# Patient Record
Sex: MALE | Race: WHITE | Employment: UNEMPLOYED | URBAN - METROPOLITAN AREA
[De-identification: names, ages, dates, MRNs, and addresses within clinical notes are randomized per-mention and may not be internally consistent; named-entity substitution may affect disease eponyms.]

---

## 2022-04-21 ENCOUNTER — TELEPHONE (OUTPATIENT)
Dept: NON INVASIVE DIAGNOSTICS | Age: 22
End: 2022-04-21

## 2022-04-21 NOTE — TELEPHONE ENCOUNTER
Scheduled Tilt table test with Dr Jean Claude Montemayor on 05/05/2022 @ 11:30 AM.  Dacia Patino at the [facility] @ 10:30.     Told the patient nothing to eat or drink after midnight  You may take your medication with tiny sip of water the day of the procedure  Hold the following medications: none  It is recommended that you have someone with you to drive following the tilt table test

## 2022-05-04 ENCOUNTER — TELEPHONE (OUTPATIENT)
Dept: CARDIAC CATH/INVASIVE PROCEDURES | Age: 22
End: 2022-05-04

## 2022-05-04 NOTE — TELEPHONE ENCOUNTER
Attempted to reminded patient of scheduled procedure on  5/5 No instructions given machine will not take message. Other phone numbers disconnected.

## 2022-05-05 ENCOUNTER — HOSPITAL ENCOUNTER (OUTPATIENT)
Dept: CARDIAC CATH/INVASIVE PROCEDURES | Age: 22
Discharge: HOME | End: 2022-05-05
Payer: MEDICAID

## 2022-05-05 VITALS
HEIGHT: 75 IN | TEMPERATURE: 97.6 F | DIASTOLIC BLOOD PRESSURE: 75 MMHG | BODY MASS INDEX: 19.27 KG/M2 | OXYGEN SATURATION: 100 % | HEART RATE: 88 BPM | RESPIRATION RATE: 16 BRPM | WEIGHT: 155 LBS | SYSTOLIC BLOOD PRESSURE: 135 MMHG

## 2022-05-05 PROCEDURE — 93660 TILT TABLE EVALUATION: CPT

## 2022-05-05 PROCEDURE — 93660 TILT TABLE EVALUATION: CPT | Performed by: STUDENT IN AN ORGANIZED HEALTH CARE EDUCATION/TRAINING PROGRAM

## 2022-05-05 NOTE — PROCEDURES
Patient Name: Amelie Ordaz    Patient Date of Birth (Age): 2000 (19 y.o.)  Date of Service: 5/5/2022  Referring Provider: Dr. Luis Manuel Clark    Patient history: Amelie Ordaz is a 19 y.o. male with a history of recurrent loss of consciousness, head trauma (2013 and 2016), anxiety, and 10 mm brain mass. He reports episodes of loss of consciousness for the past ~2 months. He reports episodes are precipitated by position change. He reports poor hydration historically. He reports reduction in frequency of events with improvement in sleep hygiene. He is being evaluated by Neurology (Dr. Luis Manuel Clark) for episodes of loss of consciousness. EEG without significant abnormality. MRI head with 10 mm mass in right anterior parietal lobe near corpus callosum concerning for multiple sclerosis, but less likely neoplasm Dr Marily Rios recommended tilt table test and extended EEG. Dr Hoa Austin contacted Dr Toña Rivers office to inform that [address] [place] [address] does not have EEG monitoring capability. Dr Hoa Austin informed to proceed with Tilt Table Test as extended EEG monitoring was being obtained separately at [facility]. Patient and father deny any other complaints at this time. Procedure: Tilt Table Test (CPT code: 16747)    Indication: patient with clinical value of demonstrating susceptibility to reflex or orthostatic syncope    : Chas Wilkerson DO    Complications: none    Procedure description: The patient presented after of period of at least 4 hours fasting. The patient's chart was reviewed and contraindications (severe coronary artery disease, severe cerebrovascular disease, and pregnancy) were not present. The risks, benefits, and alternatives to the procedure were discussed with the patient, and informed verbal and written consent was obtained. Intravenous access was obtained greater than 30 minutes prior to transporting to the tilt table lab, which is quiet and absent of distractions.  Patient was placed on a motorized tilt-table with a foot board and safety restraints. Patient was instructed to remain as calm and relaxed as possible, as well as to report any symptoms that may develop. Baseline supine blood pressures and heart rates were obtained. The baseline blood pressure is 125/66 mmHg and baseline heart rate is 72 bpm.  After 5 minutes in the supine position, the patient was moved from a passive supine position to a passive 70-degree head-up position within 10 seconds in a smooth fashion. Continuous ECG and sphygmomanometer blood pressure were monitored throughout the test. Patient's heart rate and symptoms were recorded in 3 minute intervals. After 21 minutes in the passive 70-degree head-up position, the study was nondiagnostic as no symptoms or ECG abnormalities consistent with patient's history developed, so the patient was returned to a passive supine position in a smooth fashion within 10 seconds. Additionally, no significant disturbance in patient's BP, heart rate, or heart rhythm were observed. At the conclusion of tilt table testing, the patient's blood pressure and heart rate were back at baseline. Summary: Nondiagnostic as no symptoms or ECG abnormalities consistent with patient's history developed. Keli Marcelino D.O.   Cardiac Electrophysiology  [facility]  [facility]

## 2024-04-17 ENCOUNTER — LAB (OUTPATIENT)
Dept: LAB | Facility: LAB | Age: 24
End: 2024-04-17
Payer: MEDICAID

## 2024-04-17 DIAGNOSIS — F90.2 ATTENTION-DEFICIT HYPERACTIVITY DISORDER, COMBINED TYPE: Primary | ICD-10-CM

## 2024-04-17 LAB
AMPHETAMINES UR QL SCN: NORMAL
BARBITURATES UR QL SCN: NORMAL
BENZODIAZ UR QL SCN: NORMAL
BZE UR QL SCN: NORMAL
CANNABINOIDS UR QL SCN: NORMAL
FENTANYL+NORFENTANYL UR QL SCN: NORMAL
METHADONE UR QL SCN: NORMAL
OPIATES UR QL SCN: NORMAL
OXYCODONE+OXYMORPHONE UR QL SCN: NORMAL
PCP UR QL SCN: NORMAL

## 2024-04-17 PROCEDURE — 80307 DRUG TEST PRSMV CHEM ANLYZR: CPT

## 2024-04-27 ENCOUNTER — HOSPITAL ENCOUNTER (EMERGENCY)
Facility: HOSPITAL | Age: 24
Discharge: HOME | End: 2024-04-27
Attending: EMERGENCY MEDICINE
Payer: MEDICAID

## 2024-04-27 VITALS
TEMPERATURE: 97.8 F | HEART RATE: 101 BPM | OXYGEN SATURATION: 99 % | WEIGHT: 175 LBS | HEIGHT: 72 IN | SYSTOLIC BLOOD PRESSURE: 110 MMHG | RESPIRATION RATE: 18 BRPM | DIASTOLIC BLOOD PRESSURE: 74 MMHG | BODY MASS INDEX: 23.7 KG/M2

## 2024-04-27 DIAGNOSIS — S91.331A PUNCTURE WOUND OF RIGHT FOOT, INITIAL ENCOUNTER: Primary | ICD-10-CM

## 2024-04-27 PROCEDURE — 2500000001 HC RX 250 WO HCPCS SELF ADMINISTERED DRUGS (ALT 637 FOR MEDICARE OP): Mod: SE | Performed by: EMERGENCY MEDICINE

## 2024-04-27 PROCEDURE — 2500000004 HC RX 250 GENERAL PHARMACY W/ HCPCS (ALT 636 FOR OP/ED): Mod: SE | Performed by: EMERGENCY MEDICINE

## 2024-04-27 PROCEDURE — 90471 IMMUNIZATION ADMIN: CPT | Performed by: EMERGENCY MEDICINE

## 2024-04-27 PROCEDURE — 90715 TDAP VACCINE 7 YRS/> IM: CPT | Mod: SE | Performed by: EMERGENCY MEDICINE

## 2024-04-27 PROCEDURE — 99283 EMERGENCY DEPT VISIT LOW MDM: CPT | Mod: 25

## 2024-04-27 RX ORDER — CEPHALEXIN 250 MG/1
500 CAPSULE ORAL ONCE
Status: COMPLETED | OUTPATIENT
Start: 2024-04-27 | End: 2024-04-27

## 2024-04-27 RX ADMIN — CEPHALEXIN 500 MG: 250 CAPSULE ORAL at 17:43

## 2024-04-27 RX ADMIN — TETANUS TOXOID, REDUCED DIPHTHERIA TOXOID AND ACELLULAR PERTUSSIS VACCINE, ADSORBED 0.5 ML: 5; 2.5; 8; 8; 2.5 SUSPENSION INTRAMUSCULAR at 17:43

## 2024-04-27 ASSESSMENT — COLUMBIA-SUICIDE SEVERITY RATING SCALE - C-SSRS
2. HAVE YOU ACTUALLY HAD ANY THOUGHTS OF KILLING YOURSELF?: NO
6. HAVE YOU EVER DONE ANYTHING, STARTED TO DO ANYTHING, OR PREPARED TO DO ANYTHING TO END YOUR LIFE?: NO
1. IN THE PAST MONTH, HAVE YOU WISHED YOU WERE DEAD OR WISHED YOU COULD GO TO SLEEP AND NOT WAKE UP?: NO

## 2024-04-27 ASSESSMENT — PAIN - FUNCTIONAL ASSESSMENT: PAIN_FUNCTIONAL_ASSESSMENT: 0-10

## 2024-04-27 ASSESSMENT — PAIN DESCRIPTION - PAIN TYPE: TYPE: ACUTE PAIN

## 2024-04-27 ASSESSMENT — PAIN SCALES - GENERAL: PAINLEVEL_OUTOF10: 4

## 2024-04-27 ASSESSMENT — PAIN DESCRIPTION - PROGRESSION: CLINICAL_PROGRESSION: NOT CHANGED

## 2024-04-27 NOTE — ED PROVIDER NOTES
HPI   Chief Complaint   Patient presents with    Puncture Wound     Right foot; chloé nail.         Complaint  patient stepped on a nail may have been migdalia   history of present illness this patient states he was cleaning up some chloé material he kicked a roof board which had a nail in it and the nail went through the side of his tennis shoe striking the great toe lateral aspect.    He  has a very superficial puncture wound but he thought the nail was migdalia and he has not had a tetanus shot that he is aware of.   missed or tingling to the toe or foot, no redness no drainage.  He has no symptoms and is not having pain he states he had only minimal bleeding.     physical exam:    General: Vitals noted, no distress. Afebrile. Alert and oriented  x 4 .  Pupils equal and reactive bilaterally    EENT: TMs clear. Posterior oropharynx unremarkable. No meningismus. No LAD.     Cardiac: Regular, rate, rhythm, no murmurs rubs or gallops.     Pulmonary: Lungs clear bilaterally with good aeration. No adventitious breath sounds. No wheezes rales or rhonchi.          Extremities: The right foot great toe metatarsal head area reveals a very faint superficial puncture wound along the lateral aspect of the metatarsal head region of the great toe.  No drainage.  No redness.  No bleeding.  No lymphangitis.  No bony tenderness to the foot.  The wound looks superficial.  The patient is in no distress    Skin: No rash. Intact.     Neuro: No focal neurologic deficits,. Cranial nerves normal as tested from II through XII.                                 [place] Coma Scale Score: 15                     Patient History   History reviewed. No pertinent past medical history.  History reviewed. No pertinent surgical history.  No family history on file.  Social History     Tobacco Use    Smoking status: Unknown    Smokeless tobacco: Not on file   Substance Use Topics    Alcohol use: Not on file    Drug use: Not on file       Physical Exam    ED Triage Vitals [04/27/24 1721]   Temperature Heart Rate Respirations BP   36.6 °C (97.8 °F) (!) 101 18 110/74      SpO2 Temp Source Heart Rate Source Patient Position   99 % Tympanic -- --      BP Location FiO2 (%)     -- --       Physical Exam    ED Course & [facility]   ED Course as of 04/27/24 1732   Sat Apr 27, 2024   1730  the patient is not sure what kind of allergic reaction he had to penicillin we will avoid penicillin but I am not concerned about Pseudomonas since it looks like the puncture actually went through the side of the shoe and not through the sole of a tennis shoe and the wound is very superficial [AG]      ED Course User Index  [AG] Alcon Michelle MD         Diagnoses as of 04/27/24 1732   Puncture wound of right foot, initial encounter       Medical Decision Making    The puncture wound is actually more along the lateral metatarsal head area and it is very superficial.  The patient thinks he is allergic to all penicillin type drugs but he is not sure what reaction he had.  I will provide him with Keflex and we will give him a tetanus booster shot the wound is very superficial    Procedure  Procedures  none     Alcon Michelle MD  04/27/24 1733

## 2024-04-27 NOTE — DISCHARGE INSTRUCTIONS
warm Epsom salt soaks or soapy water soaks 3 times a day.  Antibiotics as prescribed.  Follow-up with your doctor in 2 to 3 days for recheck.  Return to the ER if any redness or swelling or worsening symptoms.

## 2024-12-31 ENCOUNTER — APPOINTMENT (OUTPATIENT)
Dept: CARDIOLOGY | Facility: HOSPITAL | Age: 24
End: 2024-12-31
Payer: MEDICAID

## 2024-12-31 ENCOUNTER — HOSPITAL ENCOUNTER (EMERGENCY)
Facility: HOSPITAL | Age: 24
Discharge: HOME | End: 2024-12-31
Attending: EMERGENCY MEDICINE
Payer: MEDICAID

## 2024-12-31 ENCOUNTER — APPOINTMENT (OUTPATIENT)
Dept: RADIOLOGY | Facility: HOSPITAL | Age: 24
End: 2024-12-31
Payer: MEDICAID

## 2024-12-31 VITALS
HEIGHT: 75 IN | SYSTOLIC BLOOD PRESSURE: 128 MMHG | OXYGEN SATURATION: 97 % | RESPIRATION RATE: 16 BRPM | WEIGHT: 180 LBS | HEART RATE: 90 BPM | TEMPERATURE: 98.7 F | DIASTOLIC BLOOD PRESSURE: 72 MMHG | BODY MASS INDEX: 22.38 KG/M2

## 2024-12-31 DIAGNOSIS — R55 SYNCOPE, UNSPECIFIED SYNCOPE TYPE: ICD-10-CM

## 2024-12-31 DIAGNOSIS — R41.82 ALTERED MENTAL STATUS, UNSPECIFIED ALTERED MENTAL STATUS TYPE: Primary | ICD-10-CM

## 2024-12-31 DIAGNOSIS — E87.6 HYPOKALEMIA: ICD-10-CM

## 2024-12-31 LAB
ALBUMIN SERPL BCP-MCNC: 4.4 G/DL (ref 3.4–5)
ALP SERPL-CCNC: 76 U/L (ref 33–120)
ALT SERPL W P-5'-P-CCNC: 8 U/L (ref 10–52)
AMPHETAMINES UR QL SCN: ABNORMAL
ANION GAP BLDA CALCULATED.4IONS-SCNC: 9 MMO/L (ref 10–25)
ANION GAP BLDV CALCULATED.4IONS-SCNC: 12 MMOL/L (ref 10–25)
ANION GAP BLDV CALCULATED.4IONS-SCNC: 12 MMOL/L (ref 10–25)
ANION GAP SERPL CALC-SCNC: 24 MMOL/L (ref 10–20)
APAP SERPL-MCNC: <10 UG/ML
APPEARANCE UR: CLEAR
ARTERIAL PATENCY WRIST A: POSITIVE
AST SERPL W P-5'-P-CCNC: 11 U/L (ref 9–39)
BARBITURATES UR QL SCN: ABNORMAL
BASE EXCESS BLDA CALC-SCNC: 0.5 MMOL/L (ref -2–3)
BASE EXCESS BLDV CALC-SCNC: 1.3 MMOL/L (ref -2–3)
BASE EXCESS BLDV CALC-SCNC: 1.7 MMOL/L (ref -2–3)
BASOPHILS # BLD AUTO: 0.1 X10*3/UL (ref 0–0.1)
BASOPHILS NFR BLD AUTO: 0.5 %
BENZODIAZ UR QL SCN: ABNORMAL
BILIRUB SERPL-MCNC: 0.4 MG/DL (ref 0–1.2)
BILIRUB UR STRIP.AUTO-MCNC: NEGATIVE MG/DL
BODY TEMPERATURE: ABNORMAL
BUN SERPL-MCNC: 13 MG/DL (ref 6–23)
BZE UR QL SCN: ABNORMAL
CA-I BLDA-SCNC: 1.18 MMOL/L (ref 1.1–1.33)
CA-I BLDV-SCNC: 1.2 MMOL/L (ref 1.1–1.33)
CA-I BLDV-SCNC: 1.23 MMOL/L (ref 1.1–1.33)
CALCIUM SERPL-MCNC: 9.8 MG/DL (ref 8.6–10.3)
CANNABINOIDS UR QL SCN: ABNORMAL
CARDIAC TROPONIN I PNL SERPL HS: 3 NG/L (ref 0–20)
CHLORIDE BLDA-SCNC: 106 MMOL/L (ref 98–107)
CHLORIDE BLDV-SCNC: 103 MMOL/L (ref 98–107)
CHLORIDE BLDV-SCNC: 103 MMOL/L (ref 98–107)
CHLORIDE SERPL-SCNC: 98 MMOL/L (ref 98–107)
CO2 SERPL-SCNC: 17 MMOL/L (ref 21–32)
COLOR UR: YELLOW
CREAT SERPL-MCNC: 1.02 MG/DL (ref 0.5–1.3)
EGFRCR SERPLBLD CKD-EPI 2021: >90 ML/MIN/1.73M*2
EOSINOPHIL # BLD AUTO: 0.62 X10*3/UL (ref 0–0.7)
EOSINOPHIL NFR BLD AUTO: 3.2 %
ERYTHROCYTE [DISTWIDTH] IN BLOOD BY AUTOMATED COUNT: 12.5 % (ref 11.5–14.5)
ETHANOL SERPL-MCNC: <10 MG/DL
FENTANYL+NORFENTANYL UR QL SCN: ABNORMAL
GLUCOSE BLD MANUAL STRIP-MCNC: 181 MG/DL (ref 74–99)
GLUCOSE BLDA-MCNC: 114 MG/DL (ref 74–99)
GLUCOSE BLDV-MCNC: 121 MG/DL (ref 74–99)
GLUCOSE BLDV-MCNC: 145 MG/DL (ref 74–99)
GLUCOSE SERPL-MCNC: 153 MG/DL (ref 74–99)
GLUCOSE UR STRIP.AUTO-MCNC: NEGATIVE MG/DL
HCO3 BLDA-SCNC: 24.6 MMOL/L (ref 22–26)
HCO3 BLDV-SCNC: 25.9 MMOL/L (ref 22–26)
HCO3 BLDV-SCNC: 27.2 MMOL/L (ref 22–26)
HCT VFR BLD AUTO: 46.4 % (ref 41–52)
HCT VFR BLD EST: 42 % (ref 41–52)
HCT VFR BLD EST: 43 % (ref 41–52)
HCT VFR BLD EST: 45 % (ref 41–52)
HGB BLD-MCNC: 16.4 G/DL (ref 13.5–17.5)
HGB BLDA-MCNC: 14.1 G/DL (ref 13.5–17.5)
HGB BLDV-MCNC: 14.2 G/DL (ref 13.5–17.5)
HGB BLDV-MCNC: 14.9 G/DL (ref 13.5–17.5)
HOLD SPECIMEN: NORMAL
IMM GRANULOCYTES # BLD AUTO: 0.09 X10*3/UL (ref 0–0.7)
IMM GRANULOCYTES NFR BLD AUTO: 0.5 % (ref 0–0.9)
INHALED O2 CONCENTRATION: 21 %
INHALED O2 CONCENTRATION: 21 %
INHALED O2 CONCENTRATION: 28 %
KETONES UR STRIP.AUTO-MCNC: NEGATIVE MG/DL
LACTATE BLDA-SCNC: 0.7 MMOL/L (ref 0.4–2)
LACTATE BLDV-SCNC: 1.7 MMOL/L (ref 0.4–2)
LACTATE BLDV-SCNC: 1.7 MMOL/L (ref 0.4–2)
LACTATE BLDV-SCNC: 3.3 MMOL/L (ref 0.4–2)
LEUKOCYTE ESTERASE UR QL STRIP.AUTO: NEGATIVE
LYMPHOCYTES # BLD AUTO: 8.75 X10*3/UL (ref 1.2–4.8)
LYMPHOCYTES NFR BLD AUTO: 44.5 %
MCH RBC QN AUTO: 32.3 PG (ref 26–34)
MCHC RBC AUTO-ENTMCNC: 35.3 G/DL (ref 32–36)
MCV RBC AUTO: 92 FL (ref 80–100)
METHADONE UR QL SCN: ABNORMAL
MONOCYTES # BLD AUTO: 1.26 X10*3/UL (ref 0.1–1)
MONOCYTES NFR BLD AUTO: 6.4 %
NEUTROPHILS # BLD AUTO: 8.84 X10*3/UL (ref 1.2–7.7)
NEUTROPHILS NFR BLD AUTO: 44.9 %
NITRITE UR QL STRIP.AUTO: NEGATIVE
NRBC BLD-RTO: 0 /100 WBCS (ref 0–0)
OPIATES UR QL SCN: ABNORMAL
OXYCODONE+OXYMORPHONE UR QL SCN: ABNORMAL
OXYHGB MFR BLDA: 66.4 % (ref 94–98)
OXYHGB MFR BLDV: 31.3 % (ref 45–75)
OXYHGB MFR BLDV: 37.4 % (ref 45–75)
PCO2 BLDA: 37 MM HG (ref 38–42)
PCO2 BLDV: 40 MM HG (ref 41–51)
PCO2 BLDV: 45 MM HG (ref 41–51)
PCP UR QL SCN: ABNORMAL
PH BLDA: 7.43 PH (ref 7.38–7.42)
PH BLDV: 7.39 PH (ref 7.33–7.43)
PH BLDV: 7.42 PH (ref 7.33–7.43)
PH UR STRIP.AUTO: 5 [PH]
PLATELET # BLD AUTO: 324 X10*3/UL (ref 150–450)
PO2 BLDA: 102 MM HG (ref 85–95)
PO2 BLDV: 14 MM HG (ref 35–45)
PO2 BLDV: 20 MM HG (ref 35–45)
POTASSIUM BLDA-SCNC: 3.9 MMOL/L (ref 3.5–5.3)
POTASSIUM BLDV-SCNC: 3.6 MMOL/L (ref 3.5–5.3)
POTASSIUM BLDV-SCNC: 4 MMOL/L (ref 3.5–5.3)
POTASSIUM SERPL-SCNC: 3.1 MMOL/L (ref 3.5–5.3)
PROT SERPL-MCNC: 7.7 G/DL (ref 6.4–8.2)
PROT UR STRIP.AUTO-MCNC: NEGATIVE MG/DL
RBC # BLD AUTO: 5.07 X10*6/UL (ref 4.5–5.9)
RBC # UR STRIP.AUTO: NEGATIVE /UL
SALICYLATES SERPL-MCNC: <3 MG/DL
SAO2 % BLDA: 99 % (ref 94–100)
SAO2 % BLDV: 56 % (ref 45–75)
SAO2 % BLDV: 59 % (ref 45–75)
SODIUM BLDA-SCNC: 136 MMOL/L (ref 136–145)
SODIUM BLDV-SCNC: 137 MMOL/L (ref 136–145)
SODIUM BLDV-SCNC: 138 MMOL/L (ref 136–145)
SODIUM SERPL-SCNC: 136 MMOL/L (ref 136–145)
SP GR UR STRIP.AUTO: 1.01
SPECIMEN DRAWN FROM PATIENT: ABNORMAL
UROBILINOGEN UR STRIP.AUTO-MCNC: <2 MG/DL
WBC # BLD AUTO: 19.7 X10*3/UL (ref 4.4–11.3)

## 2024-12-31 PROCEDURE — 85025 COMPLETE CBC W/AUTO DIFF WBC: CPT | Performed by: EMERGENCY MEDICINE

## 2024-12-31 PROCEDURE — 80307 DRUG TEST PRSMV CHEM ANLYZR: CPT | Performed by: EMERGENCY MEDICINE

## 2024-12-31 PROCEDURE — 70450 CT HEAD/BRAIN W/O DYE: CPT

## 2024-12-31 PROCEDURE — 96374 THER/PROPH/DIAG INJ IV PUSH: CPT

## 2024-12-31 PROCEDURE — 70450 CT HEAD/BRAIN W/O DYE: CPT | Performed by: RADIOLOGY

## 2024-12-31 PROCEDURE — 82947 ASSAY GLUCOSE BLOOD QUANT: CPT | Mod: 59

## 2024-12-31 PROCEDURE — 80053 COMPREHEN METABOLIC PANEL: CPT | Performed by: EMERGENCY MEDICINE

## 2024-12-31 PROCEDURE — 2500000002 HC RX 250 W HCPCS SELF ADMINISTERED DRUGS (ALT 637 FOR MEDICARE OP, ALT 636 FOR OP/ED): Mod: SE | Performed by: EMERGENCY MEDICINE

## 2024-12-31 PROCEDURE — 2500000004 HC RX 250 GENERAL PHARMACY W/ HCPCS (ALT 636 FOR OP/ED): Mod: SE | Performed by: EMERGENCY MEDICINE

## 2024-12-31 PROCEDURE — 82805 BLOOD GASES W/O2 SATURATION: CPT | Mod: 91 | Performed by: EMERGENCY MEDICINE

## 2024-12-31 PROCEDURE — 81003 URINALYSIS AUTO W/O SCOPE: CPT | Performed by: EMERGENCY MEDICINE

## 2024-12-31 PROCEDURE — 36600 WITHDRAWAL OF ARTERIAL BLOOD: CPT

## 2024-12-31 PROCEDURE — 96375 TX/PRO/DX INJ NEW DRUG ADDON: CPT

## 2024-12-31 PROCEDURE — 93005 ELECTROCARDIOGRAM TRACING: CPT

## 2024-12-31 PROCEDURE — 83605 ASSAY OF LACTIC ACID: CPT | Mod: 91 | Performed by: EMERGENCY MEDICINE

## 2024-12-31 PROCEDURE — 84484 ASSAY OF TROPONIN QUANT: CPT | Performed by: EMERGENCY MEDICINE

## 2024-12-31 PROCEDURE — 96361 HYDRATE IV INFUSION ADD-ON: CPT

## 2024-12-31 PROCEDURE — 82435 ASSAY OF BLOOD CHLORIDE: CPT | Mod: 59 | Performed by: EMERGENCY MEDICINE

## 2024-12-31 PROCEDURE — 36415 COLL VENOUS BLD VENIPUNCTURE: CPT | Performed by: EMERGENCY MEDICINE

## 2024-12-31 PROCEDURE — 80179 DRUG ASSAY SALICYLATE: CPT | Performed by: EMERGENCY MEDICINE

## 2024-12-31 PROCEDURE — 99285 EMERGENCY DEPT VISIT HI MDM: CPT | Mod: 25 | Performed by: EMERGENCY MEDICINE

## 2024-12-31 RX ORDER — ONDANSETRON HYDROCHLORIDE 2 MG/ML
INJECTION, SOLUTION INTRAVENOUS
Status: COMPLETED
Start: 2024-12-31 | End: 2024-12-31

## 2024-12-31 RX ORDER — NALOXONE HYDROCHLORIDE 0.4 MG/ML
INJECTION, SOLUTION INTRAMUSCULAR; INTRAVENOUS; SUBCUTANEOUS
Status: COMPLETED
Start: 2024-12-31 | End: 2024-12-31

## 2024-12-31 RX ORDER — NALOXONE HYDROCHLORIDE 0.4 MG/ML
0.4 INJECTION, SOLUTION INTRAMUSCULAR; INTRAVENOUS; SUBCUTANEOUS ONCE
Status: COMPLETED | OUTPATIENT
Start: 2024-12-31 | End: 2024-12-31

## 2024-12-31 RX ORDER — POTASSIUM CHLORIDE 20 MEQ/1
40 TABLET, EXTENDED RELEASE ORAL ONCE
Status: DISCONTINUED | OUTPATIENT
Start: 2024-12-31 | End: 2024-12-31

## 2024-12-31 RX ORDER — ONDANSETRON HYDROCHLORIDE 2 MG/ML
4 INJECTION, SOLUTION INTRAVENOUS ONCE
Status: COMPLETED | OUTPATIENT
Start: 2024-12-31 | End: 2024-12-31

## 2024-12-31 RX ORDER — NALOXONE HYDROCHLORIDE 1 MG/ML
INJECTION INTRAMUSCULAR; INTRAVENOUS; SUBCUTANEOUS
Status: DISCONTINUED
Start: 2024-12-31 | End: 2024-12-31 | Stop reason: HOSPADM

## 2024-12-31 RX ORDER — POTASSIUM CHLORIDE 20 MEQ/1
40 TABLET, EXTENDED RELEASE ORAL ONCE
Status: COMPLETED | OUTPATIENT
Start: 2024-12-31 | End: 2024-12-31

## 2024-12-31 RX ADMIN — POTASSIUM CHLORIDE 40 MEQ: 1500 TABLET, EXTENDED RELEASE ORAL at 18:34

## 2024-12-31 RX ADMIN — ONDANSETRON 4 MG: 2 INJECTION INTRAMUSCULAR; INTRAVENOUS at 17:59

## 2024-12-31 RX ADMIN — ONDANSETRON HYDROCHLORIDE 4 MG: 2 INJECTION, SOLUTION INTRAVENOUS at 17:59

## 2024-12-31 RX ADMIN — SODIUM CHLORIDE 1000 ML: 9 INJECTION, SOLUTION INTRAVENOUS at 19:25

## 2024-12-31 RX ADMIN — NALOXONE HYDROCHLORIDE 0.4 MG: 0.4 INJECTION, SOLUTION INTRAMUSCULAR; INTRAVENOUS; SUBCUTANEOUS at 18:01

## 2024-12-31 ASSESSMENT — PAIN SCALES - GENERAL
PAINLEVEL_OUTOF10: 0 - NO PAIN
PAINLEVEL_OUTOF10: 0 - NO PAIN

## 2024-12-31 ASSESSMENT — PAIN - FUNCTIONAL ASSESSMENT: PAIN_FUNCTIONAL_ASSESSMENT: 0-10

## 2024-12-31 ASSESSMENT — COLUMBIA-SUICIDE SEVERITY RATING SCALE - C-SSRS
1. IN THE PAST MONTH, HAVE YOU WISHED YOU WERE DEAD OR WISHED YOU COULD GO TO SLEEP AND NOT WAKE UP?: NO
6. HAVE YOU EVER DONE ANYTHING, STARTED TO DO ANYTHING, OR PREPARED TO DO ANYTHING TO END YOUR LIFE?: NO
2. HAVE YOU ACTUALLY HAD ANY THOUGHTS OF KILLING YOURSELF?: NO

## 2024-12-31 NOTE — ED PROVIDER NOTES
HPI   Chief Complaint   Patient presents with    Syncope     Mom found pt in car going in and out of consciousness. Assisted by staff out of car and brought to room by wheelchair. Pt more alert once in room. States he took a pill that a friend gave him. Has a marijuana card and does use marijuana. Not sure what the friend gave him. Said it was a gummy over the counter       24-year-old male presents for evaluation of altered mental status and syncope.  Patient's mother was a patient here in the emergency department.  She went to her vehicle to leave and found her son unresponsive in the car.  ED staff went to the car and brought patient in for evaluation.  On initial arrival, patient has pinpoint pupils and is somnolent.  He has nasal discharge coming from his nose.  He is protecting his airway and responds to painful stimuli.  Shortly after arrival, patient was sat up and he is becoming more alert.  He states that he was in the car and has a medical marijuana card.  He did state that he took some pill that a friend gave him.  It was an edible.  He is not sure what was in the pill.  He also has marijuana joints in the car that are prescribed him.  On further discussion, patient states that he tried to get out of the vehicle to come into the emergency department and fainted.  He was able to crawl back into the car.  He has a history of schizoaffective disorder.  He also has a noncancerous brain tumor.  Prior history of opiate use in remission.  He has been doing well with his mental health and is established with [facility].      History provided by:  Patient, medical records and parent          Patient History   Past Medical History:   Diagnosis Date    Bipolar 1 disorder (Multi)     Schizo affective schizophrenia (Multi)      History reviewed. No pertinent surgical history.  No family history on file.  Social History     Tobacco Use    Smoking status: Unknown    Smokeless tobacco: Not on file   Substance Use  Topics    Alcohol use: Not on file    Drug use: Yes     Types: Marijuana       Physical Exam   ED Triage Vitals [12/31/24 1753]   Temp Heart Rate Respirations BP   -- (!) 128 20 137/84      SpO2 Temp src Heart Rate Source Patient Position   92 % -- -- --      BP Location FiO2 (%)     -- --       Physical Exam  Constitutional:       Appearance: He is ill-appearing.      Comments: Pinpoint pupils.  Somnolent.  Nasal discharge noted.  Localizes to pain.   HENT:      Head: Normocephalic and atraumatic.      Nose: Congestion present.      Mouth/Throat:      Mouth: Mucous membranes are moist.   Eyes:      Comments: Pupils pinpoint bilaterally   Cardiovascular:      Rate and Rhythm: Regular rhythm. Tachycardia present.      Pulses: Normal pulses.   Pulmonary:      Effort: Pulmonary effort is normal. No respiratory distress.   Abdominal:      Palpations: Abdomen is soft.      Tenderness: There is no abdominal tenderness.   Musculoskeletal:         General: No tenderness or deformity.   Skin:     General: Skin is warm and dry.   Neurological:      GCS: GCS eye subscore is 2. GCS verbal subscore is 4. GCS motor subscore is 5.      Comments: Global CNS depression.  Localizes to painful stimuli.  Initial GCS is 11.           ED Course & MDM   ED Course as of 01/01/25 1221   Tue Dec 31, 2024   1913 24-year-old male presents for evaluation of altered mental status.  Clinical presentation consistent with accidental opiate overdose.    Shortly after arrival, patient became more alert.  He has normal clear speech.  He did mention that he may have taken a marijuana gummy that was in gym by a friend and/or pill.    He was given Narcan 0.4 mg after receiving Zofran 4 mg IV.    Subsequently, patient remains completely alert and oriented to person place time and events.  He does state that he fainted as he got out of the car.  He then got himself back into the car and passed out.  Subsequently his mother found him in the car and brought  him in for evaluation.     [BT]   1915 Telemetry monitoring, laboratory studies.  Venous blood gas.  Acute toxicology panel.  Continuous end-tidal CO2.  Will reevaluate after initial workup. [BT]   1916 Leukocytosis noted with white count 19.7.  Likely reactive demargination.  Presentation is not concerning for infection.  Potassium 3.1.  Replaced by mouth.  Venous blood gas is abnormal with pH of 7.42, pO2 14, pCO2 40.  The respiratory therapist stated that she is having difficulty with the venous blood gas analyzer and is concerned that the results are erroneous.  I will check a repeat VBG as patient shows no signs of hypoxia.  He appears well-perfused and his oxygen saturation is 94% on room air.  I have a concern for lab error or hemolyzed sample or incorrect collection of sample. [BT]   1918 ECG 12 lead  EKG interpreted by me shows sinus tachycardia with rate 130.  Normal axis.  Normal intervals.  No acute injury pattern. [BT]   1957 Repeat VBG with pH 7.39.  pCO2 45.  pO2 was low at 20.  This was run multiple times on the analyzer. [BT]   2008 Needs an ABG which has been ordered.    Care turned over to Dr. Gotti at shift change.    Patient much more alert.  GCS 15.  Not showing signs of CNS or respiratory depression. 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 [BT]      ED Course User Index  [BT] Lm Nichols, DO         Diagnoses as of 01/01/25 1221   Altered mental status, unspecified altered mental status type   Syncope, unspecified syncope type   Hypokalemia     CT head wo IV contrast   Final Result   No acute intracranial abnormality.        A subcentimeter hyperdense focus adjacent to the right caudate head   likely pr corresponds to eviously reported focus of gray matter   heterotopia although suboptimally assessed on this unenhanced CT.   Please  refer to report of prior MRI for detail. If of clinical   concern repeat nonemergent MR can be considered.        Sinus disease as described above.        MACRO:   None        Signed by: Annabelle Barclay 12/31/2024 7:15 PM   Dictation workstation:   [id number]                      No data recorded     [place] Coma Scale Score: 14 (12/31/24 1756 : Meg Person RN)                           Medical Decision Making      Procedure  Procedures     Lm Nichols DO  12/31/24 2010       Lm Nichols DO  01/01/25 1222

## 2025-01-01 LAB — HOLD SPECIMEN: NORMAL

## 2025-01-01 NOTE — PROGRESS NOTES
"Wilder Whaley is a 24 y.o. male on day 0 of admission presenting with syncopal episode in the parking lot.  Patient was initially evaluated by Dr. Nichols.  Please see his note for full details the H&P.  Patient was signed out to me at change of shift pending blood gas.      Subjective   Patient is denying any symptoms at this time.  He is awake and alert and behaving normally.  He says that he just feels hungry because he has not eaten anything today.  No chest pain, palpitations, shortness of breath.       Objective     Physical Exam  Vitals and nursing note reviewed.   Constitutional:       Appearance: Normal appearance.   HENT:      Head: Normocephalic and atraumatic.   Eyes:      Extraocular Movements: Extraocular movements intact.      Conjunctiva/sclera: Conjunctivae normal.      Pupils: Pupils are equal, round, and reactive to light.   Cardiovascular:      Rate and Rhythm: Normal rate and regular rhythm.      Pulses: Normal pulses.      Heart sounds: Normal heart sounds.   Musculoskeletal:      Cervical back: Normal range of motion and neck supple.   Skin:     General: Skin is warm and dry.      Capillary Refill: Capillary refill takes less than 2 seconds.      Findings: No rash.   Neurological:      General: No focal deficit present.      Mental Status: He is alert and oriented to person, place, and time.      Motor: No weakness.      Gait: Gait normal.   Psychiatric:         Mood and Affect: Mood normal.         Last Recorded Vitals  Blood pressure 130/73, pulse 88, temperature 37.1 °C (98.7 °F), temperature source Temporal, resp. rate 13, height 1.905 m (6' 3\"), weight 81.6 kg (180 lb), SpO2 95%.  Intake/Output last 3 Shifts:  No intake/output data recorded.    Relevant Results               Labs Reviewed   CBC WITH AUTO DIFFERENTIAL - Abnormal       Result Value    WBC 19.7 (*)     nRBC 0.0      RBC 5.07      Hemoglobin 16.4      Hematocrit 46.4      MCV 92      MCH 32.3      MCHC 35.3      RDW 12.5      " Platelets 324      Neutrophils % 44.9      Immature Granulocytes %, Automated 0.5      Lymphocytes % 44.5      Monocytes % 6.4      Eosinophils % 3.2      Basophils % 0.5      Neutrophils Absolute 8.84 (*)     Immature Granulocytes Absolute, Automated 0.09      Lymphocytes Absolute 8.75 (*)     Monocytes Absolute 1.26 (*)     Eosinophils Absolute 0.62      Basophils Absolute 0.10     COMPREHENSIVE METABOLIC PANEL - Abnormal    Glucose 153 (*)     Sodium 136      Potassium 3.1 (*)     Chloride 98      Bicarbonate 17 (*)     Anion Gap 24 (*)     Urea Nitrogen 13      Creatinine 1.02      eGFR >90      Calcium 9.8      Albumin 4.4      Alkaline Phosphatase 76      Total Protein 7.7      AST 11      Bilirubin, Total 0.4      ALT 8 (*)    DRUG SCREEN,URINE - Abnormal    Amphetamine Screen, Urine Presumptive Positive (*)     Barbiturate Screen, Urine Presumptive Negative      Benzodiazepines Screen, Urine Presumptive Negative      Cannabinoid Screen, Urine Presumptive Positive (*)     Cocaine Metabolite Screen, Urine Presumptive Negative      Fentanyl Screen, Urine Presumptive Negative      Opiate Screen, Urine Presumptive Negative      Oxycodone Screen, Urine Presumptive Negative      PCP Screen, Urine Presumptive Negative      Methadone Screen, Urine Presumptive Negative      Narrative:     Drug screen results are presumptive and should not be used to assess   compliance with prescribed medication. Contact the performing [facility] laboratory   to add-on definitive confirmatory testing if clinically indicated.    Toxicology screening results are reported qualitatively. The concentration must   be greater than or equal to the cutoff to be reported as positive. The concentration   at which the screening test can detect an individual drug or metabolite varies.   The absence of expected drug(s) and/or drug metabolite(s) may indicate non-compliance,   inappropriate timing of specimen collection relative to drug administration,  poor drug   absorption, diluted/adulterated urine, or limitations of testing. For medical purposes   only; not valid for forensic use.    Interpretive questions should be directed to the laboratory medical directors.   BLOOD GAS VENOUS FULL PANEL - Abnormal    POCT pH, Venous 7.42      POCT pCO2, Venous 40 (*)     POCT pO2, Venous 14 (*)     POCT SO2, Venous 56      POCT Oxy Hemoglobin, Venous 31.3 (*)     POCT Hematocrit Calculated, Venous 45.0      POCT Sodium, Venous 137      POCT Potassium, Venous 3.6      POCT Chloride, Venous 103      POCT Ionized Calicum, Venous 1.20      POCT Glucose, Venous 145 (*)     POCT Lactate, Venous 3.3 (*)     POCT Base Excess, Venous 1.3      POCT HCO3 Calculated, Venous 25.9      POCT Hemoglobin, Venous 14.9      POCT Anion Gap, Venous 12.0      Patient Temperature        FiO2 28     BLOOD GAS VENOUS FULL PANEL - Abnormal    POCT pH, Venous 7.39      POCT pCO2, Venous 45      POCT pO2, Venous 20 (*)     POCT SO2, Venous 59      POCT Oxy Hemoglobin, Venous 37.4 (*)     POCT Hematocrit Calculated, Venous 43.0      POCT Sodium, Venous 138      POCT Potassium, Venous 4.0      POCT Chloride, Venous 103      POCT Ionized Calicum, Venous 1.23      POCT Glucose, Venous 121 (*)     POCT Lactate, Venous 1.7      POCT Base Excess, Venous 1.7      POCT HCO3 Calculated, Venous 27.2 (*)     POCT Hemoglobin, Venous 14.2      POCT Anion Gap, Venous 12.0      Patient Temperature        FiO2 21     BLOOD GAS ARTERIAL FULL PANEL - Abnormal    POCT pH, Arterial 7.43 (*)     POCT pCO2, Arterial 37 (*)     POCT pO2, Arterial 102 (*)     POCT SO2, Arterial 99      POCT Oxy Hemoglobin, Arterial 66.4 (*)     POCT Hematocrit Calculated, Arterial 42.0      POCT Sodium, Arterial 136      POCT Potassium, Arterial 3.9      POCT Chloride, Arterial 106      POCT Ionized Calcium, Arterial 1.18      POCT Glucose, Arterial 114 (*)     POCT Lactate, Arterial 0.7      POCT Base Excess, Arterial 0.5      POCT HCO3  Calculated, Arterial 24.6      POCT Hemoglobin, Arterial 14.1      POCT Anion Gap, Arterial 9 (*)     Patient Temperature        FiO2 21      Site of Arterial Puncture Radial Left      Wilder's Test Positive     POCT GLUCOSE - Abnormal    POCT Glucose 181 (*)    ACUTE TOXICOLOGY PANEL, BLOOD - Normal    Acetaminophen <10.0      Salicylate  <3      Alcohol <10     URINALYSIS WITH REFLEX CULTURE AND MICROSCOPIC - Normal    Color, Urine Yellow      Appearance, Urine Clear      Specific Gravity, Urine 1.009      pH, Urine 5.0      Protein, Urine NEGATIVE      Glucose, Urine NEGATIVE      Blood, Urine NEGATIVE      Ketones, Urine NEGATIVE      Bilirubin, Urine NEGATIVE      Urobilinogen, Urine <2.0      Nitrite, Urine NEGATIVE      Leukocyte Esterase, Urine NEGATIVE     BLOOD GAS LACTIC ACID, VENOUS - Normal    POCT Lactate, Venous 1.7     TROPONIN I, HIGH SENSITIVITY - Normal    Troponin I, High Sensitivity 3      Narrative:     Less than 99th percentile of normal range cutoff-  Female and children under 18 years old <14 ng/L; Male <21 ng/L: Negative  Repeat testing should be performed if clinically indicated.     Female and children under 18 years old 14-50 ng/L; Male 21-50 ng/L:  Consistent with possible cardiac damage and possible increased clinical   risk. Serial measurements may help to assess extent of myocardial damage.     >50 ng/L: Consistent with cardiac damage, increased clinical risk and  myocardial infarction. Serial measurements may help assess extent of   myocardial damage.      NOTE: Children less than 1 year old may have higher baseline troponin   levels and results should be interpreted in conjunction with the overall   clinical context.     NOTE: Troponin I testing is performed using a different   testing methodology at [facility] than at other   [facility]. Direct result comparisons should only   be made within the same method.   GRAY TOP    Extra Tube Hold for add-ons.      URINALYSIS WITH REFLEX CULTURE AND MICROSCOPIC    Narrative:     The following orders were created for panel order Urinalysis with Reflex Culture and Microscopic.  Procedure                               Abnormality         Status                     ---------                               -----------         ------                     Urinalysis with Reflex C...[[id number]]  Normal              Final result               Extra Urine Gray Tube[[id number]]                            In process                   Please view results for these tests on the individual orders.   EXTRA URINE GRAY TUBE     ED Medication Administration from 12/31/2024 1742 to 12/31/2024 2102         Date/Time Order Dose Route Action Action by     12/31/2024 1759 EST ondansetron (Zofran) injection 4 mg 4 mg intravenous Given MARIPOSA Person     12/31/2024 1801 EST naloxone (Narcan) injection 0.4 mg 0.4 mg intravenous Given MARIPOSA Person     12/31/2024 1834 EST potassium chloride CR (Klor-Con M20) ER tablet 40 mEq 40 mEq oral Given MARIPOSA Person     12/31/2024 1925 EST sodium chloride 0.9 % bolus 1,000 mL 1,000 mL intravenous New Bag Behzad, B     12/31/2024 2026 EST sodium chloride 0.9 % bolus 1,000 mL 0 mL intravenous Stopped Behzad, B          CT head wo IV contrast   Final Result   No acute intracranial abnormality.        A subcentimeter hyperdense focus adjacent to the right caudate head   likely pr corresponds to eviously reported focus of gray matter   heterotopia although suboptimally assessed on this unenhanced CT.   Please refer to report of prior MRI for detail. If of clinical   concern repeat nonemergent MR can be considered.        Sinus disease as described above.        MACRO:   None        Signed by: Annabelle Barclay 12/31/2024 7:15 PM   Dictation workstation:   [id number]                       Assessment/Plan   Assessment & Plan  Altered mental status, unspecified altered mental status type    Syncope, unspecified syncope  type    Hypokalemia    Patient presents to the emergency department after syncopal episode in the car after taking a pill his friend gave him.  Drug screen positive for THC and also amphetamines, but patient's med list also includes amphetamines.  Opiates were negative, although Dr. Nichols reported clinical improvement with Narcan.    Patient had some hypokalemia which was treated with oral correction in the emergency department.  He has been mentating normally since change of shift.  Mom feels comfortable taking him home.    CT scan of brain obtained and read by radiology demonstrating stable gray matter heterotopia, no evidence of trauma.    A venous blood gas had been obtained earlier in the ED stay which showed a pCO2 of 20 despite the fact the patient was mentating normally and oxygenating with a sat of 95%; therefore, a ABG was ordered at change of shift for more accurate reading.  This showed a pH of 7.43, pCO2 37, pO2 of 102 on room air.  Patient up ambulatory in the emergency department normal station, normal gait.  Mentating at baseline.  Oxygenating normally.    Results of exam and any testing were discussed with patient/family. To the best of my ability, I answered all questions. At this time, there is no indication for admission/transfer or further diagnostic testing. Patient understands to return for any new or worsening symptoms, or failure to improve as anticipated. The importance of follow-up was stressed.       I spent 0 minutes in the critical  care of this patient.      Niki Gotti MD

## 2025-01-01 NOTE — DISCHARGE INSTRUCTIONS
Drink plenty of fluids.    Return to the emergency department for any new or worsening symptoms.

## 2025-01-02 LAB
ATRIAL RATE: 130 BPM
P AXIS: 67 DEGREES
P OFFSET: 209 MS
P ONSET: 149 MS
PR INTERVAL: 142 MS
Q ONSET: 220 MS
QRS COUNT: 22 BEATS
QRS DURATION: 86 MS
QT INTERVAL: 392 MS
QTC CALCULATION(BAZETT): 576 MS
QTC FREDERICIA: 507 MS
R AXIS: 89 DEGREES
T AXIS: 59 DEGREES
T OFFSET: 416 MS
VENTRICULAR RATE: 130 BPM
